# Patient Record
Sex: MALE | Race: WHITE
[De-identification: names, ages, dates, MRNs, and addresses within clinical notes are randomized per-mention and may not be internally consistent; named-entity substitution may affect disease eponyms.]

---

## 2017-06-19 ENCOUNTER — HOSPITAL ENCOUNTER (OUTPATIENT)
Dept: HOSPITAL 39 - GMAL | Age: 67
Discharge: HOME | End: 2017-06-19
Attending: FAMILY MEDICINE
Payer: COMMERCIAL

## 2017-06-19 DIAGNOSIS — E55.9: ICD-10-CM

## 2017-06-19 DIAGNOSIS — D51.3: Primary | ICD-10-CM

## 2018-05-15 ENCOUNTER — HOSPITAL ENCOUNTER (OUTPATIENT)
Dept: HOSPITAL 39 - GMAL | Age: 68
End: 2018-05-15
Attending: FAMILY MEDICINE
Payer: MEDICARE

## 2018-05-15 DIAGNOSIS — Z12.5: ICD-10-CM

## 2018-05-15 DIAGNOSIS — E29.1: Primary | ICD-10-CM

## 2018-05-15 PROCEDURE — 84402 ASSAY OF FREE TESTOSTERONE: CPT

## 2018-05-15 PROCEDURE — 84403 ASSAY OF TOTAL TESTOSTERONE: CPT

## 2018-12-26 ENCOUNTER — HOSPITAL ENCOUNTER (OUTPATIENT)
Dept: HOSPITAL 39 - ER | Age: 68
Setting detail: OBSERVATION
LOS: 1 days | Discharge: HOME | End: 2018-12-27
Attending: NURSE PRACTITIONER | Admitting: NURSE PRACTITIONER
Payer: MEDICARE

## 2018-12-26 DIAGNOSIS — Z87.11: ICD-10-CM

## 2018-12-26 DIAGNOSIS — Z79.899: ICD-10-CM

## 2018-12-26 DIAGNOSIS — F17.210: ICD-10-CM

## 2018-12-26 DIAGNOSIS — E78.5: ICD-10-CM

## 2018-12-26 DIAGNOSIS — Z88.5: ICD-10-CM

## 2018-12-26 DIAGNOSIS — Z88.6: ICD-10-CM

## 2018-12-26 DIAGNOSIS — I10: ICD-10-CM

## 2018-12-26 DIAGNOSIS — Z79.84: ICD-10-CM

## 2018-12-26 DIAGNOSIS — E83.42: ICD-10-CM

## 2018-12-26 DIAGNOSIS — I48.91: Primary | ICD-10-CM

## 2018-12-26 DIAGNOSIS — G47.33: ICD-10-CM

## 2018-12-26 DIAGNOSIS — Z79.1: ICD-10-CM

## 2018-12-26 DIAGNOSIS — E11.65: ICD-10-CM

## 2018-12-26 DIAGNOSIS — Z79.51: ICD-10-CM

## 2018-12-26 DIAGNOSIS — K21.9: ICD-10-CM

## 2018-12-26 DIAGNOSIS — J44.9: ICD-10-CM

## 2018-12-26 DIAGNOSIS — N40.0: ICD-10-CM

## 2018-12-26 PROCEDURE — 71045 X-RAY EXAM CHEST 1 VIEW: CPT

## 2018-12-26 PROCEDURE — 99285 EMERGENCY DEPT VISIT HI MDM: CPT

## 2018-12-26 PROCEDURE — 36415 COLL VENOUS BLD VENIPUNCTURE: CPT

## 2018-12-26 PROCEDURE — 96375 TX/PRO/DX INJ NEW DRUG ADDON: CPT

## 2018-12-26 PROCEDURE — 94640 AIRWAY INHALATION TREATMENT: CPT

## 2018-12-26 PROCEDURE — 85025 COMPLETE CBC W/AUTO DIFF WBC: CPT

## 2018-12-26 PROCEDURE — 96372 THER/PROPH/DIAG INJ SC/IM: CPT

## 2018-12-26 PROCEDURE — 93005 ELECTROCARDIOGRAM TRACING: CPT

## 2018-12-26 PROCEDURE — 84484 ASSAY OF TROPONIN QUANT: CPT

## 2018-12-26 PROCEDURE — 83735 ASSAY OF MAGNESIUM: CPT

## 2018-12-26 PROCEDURE — 94760 N-INVAS EAR/PLS OXIMETRY 1: CPT

## 2018-12-26 PROCEDURE — 84443 ASSAY THYROID STIM HORMONE: CPT

## 2018-12-26 PROCEDURE — 82550 ASSAY OF CK (CPK): CPT

## 2018-12-26 PROCEDURE — 82553 CREATINE MB FRACTION: CPT

## 2018-12-26 PROCEDURE — 36416 COLLJ CAPILLARY BLOOD SPEC: CPT

## 2018-12-26 PROCEDURE — 85610 PROTHROMBIN TIME: CPT

## 2018-12-26 PROCEDURE — 85730 THROMBOPLASTIN TIME PARTIAL: CPT

## 2018-12-26 PROCEDURE — 96365 THER/PROPH/DIAG IV INF INIT: CPT

## 2018-12-26 PROCEDURE — 80053 COMPREHEN METABOLIC PANEL: CPT

## 2018-12-26 PROCEDURE — 94664 DEMO&/EVAL PT USE INHALER: CPT

## 2018-12-26 PROCEDURE — 83880 ASSAY OF NATRIURETIC PEPTIDE: CPT

## 2018-12-26 PROCEDURE — 82948 REAGENT STRIP/BLOOD GLUCOSE: CPT

## 2018-12-26 RX ADMIN — INSULIN LISPRO SCH: 100 INJECTION, SOLUTION INTRAVENOUS; SUBCUTANEOUS at 17:13

## 2018-12-26 RX ADMIN — INSULIN LISPRO SCH: 100 INJECTION, SOLUTION INTRAVENOUS; SUBCUTANEOUS at 21:17

## 2018-12-26 RX ADMIN — VERAPAMIL HYDROCHLORIDE ONE MG: 120 TABLET, FILM COATED, EXTENDED RELEASE ORAL at 10:55

## 2018-12-26 RX ADMIN — LEVALBUTEROL SCH MG: 1.25 SOLUTION RESPIRATORY (INHALATION) at 21:05

## 2018-12-26 RX ADMIN — PANTOPRAZOLE SODIUM SCH MG: 40 INJECTION, POWDER, FOR SOLUTION INTRAVENOUS at 15:27

## 2018-12-26 RX ADMIN — DILTIAZEM HYDROCHLORIDE SCH MG: 30 TABLET, FILM COATED ORAL at 23:43

## 2018-12-26 RX ADMIN — VERAPAMIL HYDROCHLORIDE SCH MG: 120 TABLET, FILM COATED, EXTENDED RELEASE ORAL at 21:12

## 2018-12-26 RX ADMIN — Medication SCH ML: at 21:17

## 2018-12-26 RX ADMIN — BUDESONIDE AND FORMOTEROL FUMARATE DIHYDRATE SCH PUFF: 160; 4.5 AEROSOL RESPIRATORY (INHALATION) at 21:05

## 2018-12-26 RX ADMIN — INSULIN LISPRO SCH UNITS: 100 INJECTION, SOLUTION INTRAVENOUS; SUBCUTANEOUS at 17:10

## 2018-12-26 RX ADMIN — LEVALBUTEROL SCH MG: 1.25 SOLUTION RESPIRATORY (INHALATION) at 17:36

## 2018-12-26 NOTE — RAD
Study: Single Frontal View of the Chest.



Indication:new afib, chest pressure



Comparison:   May 10, 2014



Impression:



Cardiomegaly with mild central vascular congestion. Subtle

bibasilar opacities which may reflect pneumonia, atelectasis, or

pulmonary edema. Follow-up to resolution recommended. Tiny

pleural effusions suspected. No pneumothorax.



Electronically signed by:  Binu Dominguez MD  12/26/2018 11:02 AM

CST Workstation: [id number]

## 2018-12-26 NOTE — ED.PDOC
History of Present Illness





- General


Chief Complaint: Cardiovascular Problem


Stated Complaint: Weak, chest tightness, SOB


Time Seen by Provider: 18 10:39


Source: patient


Exam Limitations: no limitations





- History of Present Illness


Initial Comments: 





The patient is a 68-year-old  male presenting to the emergency room 

secondary to one to 2 weeks of intermittent shortness of breath and very mild 

chest pressure.  It was at its worse this morning before he came in to his 

primary care doctor's office.  He had not taken his morning dose of verapamil 

yet and had come in for refill on some of his medications.  He was found to have

a heart rate in the 140s to 160s.  Upon arrival here he is in A. fib with RVR.  

He does not apparently have any history of A. fib with RVR that he knows of 

however verapamil is a choice of antihypertensive medication without that 

history.  He does not take any blood thinners.  He is not currently having any 

chest pain.  He reports some mild shortness of breath.  He has not hypoxic.  He 

is pleasant and cooperative.  He has had some mild episodes of fluttering in his

chest. No syncope or near syncope.  No history of any coronary artery disease.  

No history of any valvular disease.


Timing/Duration: unsure


Severity: mild


Improving Factors: nothing


Worsening Factors: nothing


Associated Symptoms: shortness of breath


Allergies/Adverse Reactions: 


Allergies





Hydrocodone [From Hydrocodone W/Acetaminophen] Allergy (Intermediate, Verified 

18 11:14)


   Other


   Itching 


Morphine Adverse Reaction (Severe, Verified 18 11:14)


   Other


   Makes him "GO NUTS" 








Home Medications: 


Ambulatory Orders





Atorvastatin Calcium 80 mg PO DAILY 14 


Montelukast [Singulair] 10 mg PO DAILY 14 


Valsartan-Hydrochlorothiazide [Diovan Hct 160-12.5 mg] 1 tab PO DAILY 14 


Verapamil HCl [Verapamil HCl ER] 240 mg PO DAILY 05/10/14 


Fluticasone Prop 0.05% Nasal [Flonase Nasal Spray] 100 mcg NA PRN 14 


Budesonide-Formoterol Fumarate [Symbicort 160-4.5 Mcg/Act] 2 puff INH BID 

18 


Meloxicam 15 mg PO DAILY 18 


Metformin HCl 1,000 mg PO BID 18 


Tamsulosin [Flomax] 0.4 mg PO BEDTIME 18 


raNITIdine HCL [Zantac] 150 mg PO DAILY 18 











Review of Systems





- Review of Systems


Constitutional: States: no symptoms reported


EENTM: States: no symptoms reported


Respiratory: States: short of breath


Cardiology: States: see HPI, palpitations


Gastrointestinal/Abdominal: States: no symptoms reported


Genitourinary: States: no symptoms reported


Musculoskeletal: States: no symptoms reported


Skin: States: no symptoms reported


Neurological: States: no symptoms reported


Endocrine: States: no symptoms reported


All other Systems: No Change from Baseline





Past Medical History (General)





- Patient Medical History


Hx Seizures: No


Hx Stroke: No


Hx Asthma: Yes


Hx of COPD: No


Hx Cardiac Disorders: No


Hx Congestive Heart Failure: No


Hx Pacemaker: No


Hx Hypertension: Yes


Hx Diabetes: Yes


Hx Gastroesophageal Reflux: Yes


Hx MRSA: No





- Vaccination History


Hx Influenza Vaccination: No


Hx Pneumococcal Vaccination: No





- Social History


Hx Tobacco Use: Yes


Hx Alcohol Use: No


Hx Substance Use: No


Hx Physical Abuse: No


Hx Emotional Abuse: No





Family Medical History





- Family History


  ** Father


Living Status: 


Cause of Death: Cardiac in nature


Hx Family Hypertension: Yes


Hx Cardiac Disease: Yes


Hx Family Diabetes: Yes





Physical Exam





- Physical Exam


General Appearance: Alert, Comfortable, No apparent distress


Eye Exam: bilateral normal


Ears, Nose, Throat: hearing grossly normal, normal ENT inspection, normal 

pharynx


Neck: full range of motion, supple, normal inspection


Respiratory: lungs clear, normal breath sounds, no respiratory distress, no 

accessory muscle use


Cardiovascular/Chest: normal peripheral pulses, no edema, tachycardia, 

irregularly irregular


Peripheral Pulses: radial,right: 2+, radial,left: 2+, dorsalis pedis,right: 2+, 

dorsalis pedis,left: 2+


Gastrointestinal/Abdominal: non tender - obese, soft


Rectal Exam: deferred


Back Exam: no CVA tenderness, no vertebral tenderness


Extremity: normal range of motion, non-tender, normal inspection, no pedal 

edema, normal capillary refill


Neurologic: CNs II-XII nml as tested, alert, normal mood/affect, oriented x 3


Skin Exam: normal color


Comments: 





                               Vital Signs - 24 hr











  18





  10:36 11:00 11:15


 


Temperature 98.7 F  


 


Pulse Rate [ 154 H 136 H 122 H





Apical]   


 


Respiratory 24 24 24





Rate   


 


Blood Pressure 116/85 148/83 114/90





[Left Arm]   


 


O2 Sat by Pulse 94 L 94 L 92 L





Oximetry   














Progress





- Progress


Progress: 





18 12:16


the patient is a 68-year-old  male presenting to the emergency room 

secondary to mildly symptomatic atrial fibrillation with rapid ventricular rate 

that appears to be a new diagnosis.  He did receive a dose of IV diltiazem along

with a small dose of oral verapamil.  He will need another dose of oral 

verapamil later in the day.  He has also received 1 dose of subcutaneous 

Lovenox.  He is also received one small trial dose of IV Lopressor.  We are 

currently awaiting the results of this.  No evidence of any elevation of cardiac

enzymes at this time though a repeat in 8 hours or so may be warranted.  Admit 

for rate control and anticoagulation as well as monitoring overnight.  He is 

feeling much better with the rate control.





- Results/Orders


Results/Orders: 





                                Laboratory Tests











  18





  11:08 11:08 11:08


 


WBC   6.7 


 


RBC   5.00 


 


Hgb   16.8 


 


Hct   49.7 


 


MCV   99.4 H 


 


MCH   33.5 H 


 


MCHC   33.7 


 


RDW   14.2 


 


Plt Count   166 


 


MPV   9.5 


 


Absolute Neuts (auto)   4.50 


 


Absolute Lymphs (auto)   1.60 


 


Absolute Monos (auto)   0.50 


 


Absolute Eos (auto)   0.10 


 


Absolute Basos (auto)   0.10 


 


Neutrophils %   66.7 


 


Lymphocytes %   23.3 


 


Monocytes %   8.0 


 


Eosinophils %   1.0 


 


Basophils %   1.0 


 


PT    12.4 H


 


INR    1.24 H


 


PTT (SP)    24.1


 


Sodium  140  


 


Potassium  3.7  


 


Chloride  103  


 


Carbon Dioxide  28  


 


Anion Gap  12.7  


 


BUN  12  


 


Creatinine  0.65  


 


BUN/Creatinine Ratio  18.5  


 


Random Glucose  154 H  


 


Serum Osmolality  282.2  


 


Calcium  9.8  


 


Magnesium  1.5 L  


 


Total Bilirubin  1.1 H  


 


AST  25  


 


ALT  46  


 


Alkaline Phosphatase  57  


 


Creatine Kinase  105  


 


CK-MB (CK-2)  2.6  


 


Troponin I  < 0.02  


 


B-Natriuretic Peptide  140.0 H  


 


Serum Total Protein  7.3  


 


Albumin  4.1  


 


Globulin  3.2  


 


Albumin/Globulin Ratio  1.3  


 


TSH  1.63  








chest x-ray shows some possible very mild fluid overload.


EKG shows a missing 6-lead however the patient's in atrial fibrillation with RVR

at 136 bpm.  [place] is mildly to the right.  Poor R-wave progression in anterior 

leads.  Nonspecific ST segment changes otherwise.  Corrected QT interval is 

mildly prolonged but slightly difficult to interpret.  He will need repeat EKGs.





Departure





- Departure


Clinical Impression: 


 Atrial fibrillation with RVR





Disposition: Admit Patient


Condition: Fair


Departure Forms:  ED Discharge - Pt. Copy, Patient Portal Self Enrollment


Home Medications: 


Ambulatory Orders





Atorvastatin Calcium 80 mg PO DAILY 14 


Montelukast [Singulair] 10 mg PO DAILY 14 


Valsartan-Hydrochlorothiazide [Diovan Hct 160-12.5 mg] 1 tab PO DAILY 14 


Verapamil HCl [Verapamil HCl ER] 240 mg PO DAILY 05/10/14 


Fluticasone Prop 0.05% Nasal [Flonase Nasal Spray] 100 mcg NA PRN 14 


Budesonide-Formoterol Fumarate [Symbicort 160-4.5 Mcg/Act] 2 puff INH BID 

18 


Meloxicam 15 mg PO DAILY 18 


Metformin HCl 1,000 mg PO BID 18 


Tamsulosin [Flomax] 0.4 mg PO BEDTIME 18 


raNITIdine HCL [Zantac] 150 mg PO DAILY 18 











Decision To Admit





- Decistion To Admit


Decision to Admit Reason: Medical Nature


Decision to Admit Date: 18


Decision to Admit Time: 12:18

## 2018-12-26 NOTE — HP
SUPERVISING PHYSICIAN:  Baudilio Diaz MD



CHIEF COMPLAINT:  Chest tightness and shortness of breath.



HISTORY OF PRESENT ILLNESS:  This is a 68-year-old male patient who has been 
feeling poorly over the last 2 to 3 weeks.  He felt like he was coming down with
an upper respiratory infection and he had some shortness of breath.  He also had
some fluttering feelings in his chest and most of the time when he sat down to 
rest, his symptoms improved, but over the last day or so, they had worsened to 
the point where he was somewhat worried about it and he had to come to his 
primary care physician's office, Dr. Schultz, this morning to get some 
medications.  He mentioned it to Dr. Schultz and at that time, he was found to 
have a rapid heart rate in the 140s and was sent to the Emergency Room.  In the 
Emergency Room, he was found to be in atrial fibrillation with rapid ventricular
response.  His rate was in the 140s to 160s.  He has had no history of atrial 
fibrillation, but is on verapamil as an antihypertensive.  He had not taken his 
verapamil this morning.  He is on no blood thinners.  In the Emergency Room, he 
did not have any chest pain, but he did have some shortness of breath.  He also 
had chest pains during the week, but again when he rested, those symptoms 
improved.  In the Emergency Room, his temperature was 98.7, blood pressure 
116/85, respiratory rate 24, O2 saturation 94% on room air.  His EKG showed 
atrial fibrillation with rapid ventricular response.  His lab showed CBC that 
basically within normal limits.  His PT was 12.4, INR 1.24, PTT 44.1.  
Electrolytes were basically within normal limits with the exception that his 
magnesium was slightly low at 1.5.  His sugar was slightly high in the 150s.  
Total bilirubin was slightly elevated at 1.1 and his BNP was 140 although he 
says he has no history of congestive heart failure.  Chest x-ray shows 
cardiomegaly with mild central vascular congestion, subtle bibasilar opacities 
which may reflect pneumonia, atelectasis or pulmonary edema.  Followup to 
resolution is recommended.  The patient was given Cardizem IV as well as some 
magnesium.  He was then given his regular dose of verapamil and given Lovenox 1 
mg/kg.  His heart rate came down into the 100s to one-teens and I was called for
hospital admission.



PAST MEDICAL HISTORY: 

1.  Hyperlipidemia.

2.  Hypertension.

3.  Chronic obstructive pulmonary disease.

4.  Obstructive sleep apnea.

5.  Tobacco abuse.

6.  Gastroesophageal reflux disease.

7.  Peptic ulcer disease.

8.  Benign prostatic hypertrophy.

9.  Type 2 diabetes mellitus.

10. Perennial allergies.

11. Obesity.



PAST SURGICAL HISTORY:

1.  Lumbar laminectomy in 1986 and 1988.

2.  Septoplasty.



OUTPATIENT MEDICATIONS:  Per the EMR and awaiting verification.  



ALLERGIES:  HYDROCODONE, MORPHINE.



SOCIAL HISTORY:  He lives in [place].  He is  and has two children. 
He is a current smoker and has smoked since the age of 21.  Several years ago, 
he reduced his smoking from 2 packs daily to 1 pack daily and in the last 6 
months, has tried to wean himself down and is down to 15 cigarettes daily.  He 
drinks alcohol on a regular basis, but only socially.  He denies any illicit 
drug use.  



REVIEW OF SYSTEMS: 

GENERAL: Negative for fever, chills, fatigue or weight changes.  

HEENT: Positive for perennial allergies with some rhinorrhea.  Negative for ear 
pain, vision changes or sore throat.  

RESPIRATORY: Positive for shortness of breath.  Negative for wheezing, coughing.

CARDIAC: Positive for chest pain in the last several days, but negative for 
chest pain in the Emergency Room.  Positive for palpitations.  

GASTROINTESTINAL: Negative for nausea, vomiting, diarrhea, constipation.

GENITOURINARY: Negative for hematuria, dysuria or polyuria.

MUSCULOSKELETAL: Negative for arthralgias, myalgias.  

SKIN:  Negative for lesions or rashes.  

NEUROLOGIC: Negative for headache, dizziness or seizures.  



PHYSICAL EXAMINATION: 



VITAL SIGNS: Temperature 98.  Heart 99.  Blood pressure 140/66.  Respiratory 
rate 16.  O2 saturation 92% on room air.  



GENERAL:  This is a 68-year-old obese male who is sitting on the side of his 
hospital bed.  He is in no acute distress.  



HEENT:  Normocephalic, atraumatic.  Pupils are equal and reactive.  He does have
some mild rhinorrhea.  Oropharynx is clear.  



NECK:  Supple without mass. 



RESPIRATORY: Essentially clear to auscultation bilaterally, but he does have 
distant breath sounds and is somewhat diminished at the bases.



CHEST:  There is equal rise and fall of the chest with inspiration and 
expiration.  



CARDIOVASCULAR:  Regular to tachycardic rate, irregular rhythm.  



GASTROINTESTINAL: Abdomen is soft, nondistended, nontender.  Bowel sounds are 
positive.  



EXTREMITIES:  No cyanosis, clubbing or edema.  



NEUROLOGIC: Awake, alert and oriented times three.



LABORATORY:  Labs and films are as per history of present illness.  



IMPRESSION:

1.  Atrial fibrillation/rapid ventricular response, new onset.

2.  Hypertension.

3.  Diabetes mellitus, type 2.

4.  Chronic obstructive pulmonary disease without exacerbation in a chronic 
smoker.

5.  Tobacco abuse.

6.  Obstructive sleep apnea.

7.  Gastroesophageal reflux disease.



PLAN:  We will place the patient in observation.  He will be on the cardiac 
monitor.  He has been given Lovenox 1 mg/kg every 12 hours.  Tomorrow morning, I
will start him on Eliquis and we will discontinue the Lovenox.  He has a proton 
pump inhibitor for ulcer prophylaxis.  We will watch him closely overnight.  He 
will need a close followup with a cardiologist as well as his primary care 
physician, Dr. Schultz.  I have added some Cardizem 30 mg every 6 hours due to his
heart rate continuing on the 100s to one-teens.  After he sees cardiology, they 
can adjust that as needed.  We will continue to monitor the patient closely and 
follow as needed.  Dr. Diaz is the collaborating physician and available for 
consultation.  



#[id number]

[facility]

## 2018-12-27 VITALS — TEMPERATURE: 97.8 F

## 2018-12-27 VITALS — SYSTOLIC BLOOD PRESSURE: 132 MMHG | DIASTOLIC BLOOD PRESSURE: 83 MMHG

## 2018-12-27 VITALS — OXYGEN SATURATION: 95 %

## 2018-12-27 RX ADMIN — DILTIAZEM HYDROCHLORIDE SCH MG: 30 TABLET, FILM COATED ORAL at 05:47

## 2018-12-27 RX ADMIN — PANTOPRAZOLE SODIUM SCH: 40 INJECTION, POWDER, FOR SOLUTION INTRAVENOUS at 15:38

## 2018-12-27 RX ADMIN — BUDESONIDE AND FORMOTEROL FUMARATE DIHYDRATE SCH PUFF: 160; 4.5 AEROSOL RESPIRATORY (INHALATION) at 07:25

## 2018-12-27 RX ADMIN — DILTIAZEM HYDROCHLORIDE SCH MG: 30 TABLET, FILM COATED ORAL at 11:46

## 2018-12-27 RX ADMIN — LEVALBUTEROL SCH MG: 1.25 SOLUTION RESPIRATORY (INHALATION) at 07:25

## 2018-12-27 RX ADMIN — INSULIN LISPRO SCH: 100 INJECTION, SOLUTION INTRAVENOUS; SUBCUTANEOUS at 07:19

## 2018-12-27 RX ADMIN — VERAPAMIL HYDROCHLORIDE ONE: 120 TABLET, FILM COATED, EXTENDED RELEASE ORAL at 10:02

## 2018-12-27 RX ADMIN — LEVALBUTEROL SCH MG: 1.25 SOLUTION RESPIRATORY (INHALATION) at 13:31

## 2018-12-27 RX ADMIN — VERAPAMIL HYDROCHLORIDE SCH MG: 120 TABLET, FILM COATED, EXTENDED RELEASE ORAL at 08:46

## 2018-12-27 RX ADMIN — INSULIN LISPRO SCH: 100 INJECTION, SOLUTION INTRAVENOUS; SUBCUTANEOUS at 11:44

## 2018-12-27 RX ADMIN — Medication SCH ML: at 08:48

## 2018-12-28 NOTE — DS
SUPERVISING PHYSICIAN:  Baudilio Diaz MD



DISCHARGE DIAGNOSIS: 

1.  Atrial fibrillation with a rapid ventricular response, new onset.

2.  Hypertension, stable.

3.  Diabetes mellitus, type 2.

4.  Chronic obstructive pulmonary disease without exacerbation in a chronic 
smoker.

5.  Tobacco abuse.

6.  Obstructive sleep apnea.

7.  Gastroesophageal reflux disease.



HISTORY OF PRESENT ILLNESS:  This is a 68-year-old male patient who has been 
feeling poorly over the previous 2 to 3 weeks.  He initially felt like he was 
coming down with an upper respiratory infection and he had some shortness of 
breath.  He also had some fluttering feelings in his chest and most of the time 
when he sat down to rest, his symptoms improved, but over the previous one to 
two days before his admission, his symptoms had worsened to the point that he 
became worried about it.  He had gone to his primary care physician's office, 
Dr. Schultz, to get some medication refills and he mentioned it to his nurse.  An 
EKG was done at that time and he was found to have a rapid heart rate and was 
sent to the Emergency Room.  In the Emergency Room, he was found to have a heart
rate in the 140s to 160s.  He was in atrial fibrillation with rapid ventricular 
response.  He has had no history of atrial fibrillation, but is on verapamil as 
an antihypertensive.  He has had hypertension since he was in his late teens.  
He had not taken his morning dose of verapamil.  In the Emergency Room, he was 
given his verapamil as well as some IV Cardizem.  His heart rate went down to 
the 130s.  He was also given some Lopressor IV.  His heart rate came down to the
one-teens to 120s.  Hemodynamically, he remained stable.  His temperature was 
98.7, blood pressure 116/85, respiratory rate 24, O2 saturation 94% on room air.
 His CBC that basically within normal limits.  His coags were within normal 
limits.  Electrolytes were basically within normal limits with the exception 
that his magnesium was slightly low at 1.5.  His sugar was slightly high in the 
150s.  Total bilirubin was slightly elevated at 1.1 and his BNP was 140 although
he says he has no history of congestive heart failure.  Chest x-ray shows 
cardiomegaly with mild central vascular congestion, subtle bibasilar opacities 
which may represent pneumonia, atelectasis or pulmonary edema.  Followup was 
recommended.  His heart rate came down into the 100s to one-teens and he was 
placed in observation in the hospital.



HOSPITAL COURSE:  His heart rate continued in the 100s to 1-teens, occasionally 
in the 120s after his initial admission.  He was then started on Cardizem 30 mg 
every 6 hours.  His heart rate came down to the 80s and 90s.  Lovenox 1 mg/kg 
was started in the Emergency Room and continued after admission.  He was 
transitioned to Eliquis 5 mg b.i.d. for anticoagulation.  He continued in atrial
fibrillation.  His home medications were restarted.  He had no complaints of 
chest pain during his hospital admission.  This morning, he continued in atrial 
fibrillation with a heart rate that varied between 66 and 85.  His blood 
pressure remained stable with a high blood pressure of 153/83 and his lowest 
blood pressure was 117/75.  He was afebrile.  His O2 saturations remained 
stable.  His followup showed a CBC within normal limits as well as CMP that was 
basically within normal limits.  His blood sugars ran as low as 101 up to 158.  
His bilirubin normalized to 1.0.  He had no complaints of chest pain or 
shortness of breath.  It is to be noted that his heart rate did go up to the low
100s with exertion, but there were no complaints of shortness of breath or chest
pain.  He will be discharged home in stable condition. 



DISCHARGE PLAN:  The patient will be discharged home in stable condition.  He is
to continue his previous medications.  I have added Cardizem 30 mg every 6 hours
until he sees Dr. Schultz on 01/02/19 at 9:15 AM.  At that time, it would be 
recommended he have a cardiology referral.  He will also need a followup x-ray 
as recommended by the radiologist.  He will also continue on Eliquis 5 mg b.i.d.
 I have sent a prescription for the Cardizem to [facility] in 
[place].  I have given him 2 weeks of Eliquis samples.  He is to followup 
with Dr. Schultz' office or return to the hospital for any problems or 
complications.  We discussed his heart rate and he is to take his heart rate and
blood pressure medications daily, especially if he feels any fluttering in the 
chest or shortness of breath.  He is to increase his activity as tolerated, but 
to limit his exertional activities until he follows up with Dr. Schultz and 
cardiologist.  



DISCHARGE MEDICATIONS:

1.  Valsartan/hydrochlorothiazide.

2.  Singulair.

3.  Atorvastatin.

4.  Flonase nasal spray.

5.  Zantac.

6.  Flomax.

7.  Metformin.

8.  Meloxicam.

9.  Symbicort.

10. Glimepiride.

11. Verapamil.

12. Eliquis.

13. Diltiazem.  



#[id number]

[facility]

## 2019-02-04 ENCOUNTER — HOSPITAL ENCOUNTER (OUTPATIENT)
Dept: HOSPITAL 39 - GMAL | Age: 69
End: 2019-02-04
Attending: FAMILY MEDICINE
Payer: MEDICARE

## 2019-02-04 DIAGNOSIS — E55.9: ICD-10-CM

## 2019-02-04 DIAGNOSIS — D51.3: Primary | ICD-10-CM

## 2019-04-10 ENCOUNTER — HOSPITAL ENCOUNTER (OUTPATIENT)
Dept: HOSPITAL 39 - GMAL | Age: 69
End: 2019-04-10
Attending: FAMILY MEDICINE
Payer: MEDICARE

## 2019-04-10 DIAGNOSIS — J30.9: Primary | ICD-10-CM

## 2019-06-18 ENCOUNTER — HOSPITAL ENCOUNTER (OUTPATIENT)
Dept: HOSPITAL 39 - LAB.NP | Age: 69
End: 2019-06-18
Attending: FAMILY MEDICINE
Payer: MEDICARE

## 2019-06-18 DIAGNOSIS — D51.3: Primary | ICD-10-CM

## 2019-06-18 DIAGNOSIS — E55.9: ICD-10-CM

## 2019-06-18 DIAGNOSIS — E78.5: ICD-10-CM

## 2019-06-18 DIAGNOSIS — E11.9: ICD-10-CM

## 2019-06-18 DIAGNOSIS — I10: ICD-10-CM

## 2019-08-06 ENCOUNTER — HOSPITAL ENCOUNTER (OUTPATIENT)
Dept: HOSPITAL 39 - GMAL | Age: 69
End: 2019-08-06
Attending: FAMILY MEDICINE
Payer: MEDICARE

## 2019-08-06 DIAGNOSIS — I10: ICD-10-CM

## 2019-08-06 DIAGNOSIS — R53.82: Primary | ICD-10-CM

## 2019-08-06 DIAGNOSIS — E78.2: ICD-10-CM

## 2020-07-07 ENCOUNTER — HOSPITAL ENCOUNTER (OUTPATIENT)
Dept: HOSPITAL 39 - GMAL | Age: 70
End: 2020-07-07
Attending: FAMILY MEDICINE
Payer: MEDICARE

## 2020-07-07 DIAGNOSIS — E55.9: ICD-10-CM

## 2020-07-07 DIAGNOSIS — R53.82: ICD-10-CM

## 2020-07-07 DIAGNOSIS — I10: ICD-10-CM

## 2020-07-07 DIAGNOSIS — Z12.5: ICD-10-CM

## 2020-07-07 DIAGNOSIS — D51.3: Primary | ICD-10-CM

## 2020-07-07 PROCEDURE — 84443 ASSAY THYROID STIM HORMONE: CPT

## 2020-07-07 PROCEDURE — 82306 VITAMIN D 25 HYDROXY: CPT

## 2020-07-07 PROCEDURE — 82607 VITAMIN B-12: CPT

## 2020-07-22 ENCOUNTER — HOSPITAL ENCOUNTER (OUTPATIENT)
Dept: HOSPITAL 39 - GMAL | Age: 70
End: 2020-07-22
Attending: FAMILY MEDICINE
Payer: MEDICARE

## 2020-07-22 DIAGNOSIS — R97.20: Primary | ICD-10-CM
